# Patient Record
(demographics unavailable — no encounter records)

---

## 2024-11-12 NOTE — HISTORY OF PRESENT ILLNESS
[de-identified] : 72 yr old female c/o some hearing problems when the background noise is loud +tinnitus occ  +noise exp in her 20's-40's lots of concerts -hx otitis, head trauma +FH father presby and WWll

## 2024-11-12 NOTE — REVIEW OF SYSTEMS
[Sneezing] : sneezing [Seasonal Allergies] : seasonal allergies [Post Nasal Drip] : post nasal drip [Hearing Loss] : hearing loss [Ear Noises] : ear noises [Negative] : Heme/Lymph